# Patient Record
Sex: MALE | Race: WHITE
[De-identification: names, ages, dates, MRNs, and addresses within clinical notes are randomized per-mention and may not be internally consistent; named-entity substitution may affect disease eponyms.]

---

## 2018-06-26 LAB
ADD MANUAL DIFF: NO
ANION GAP SERPL CALC-SCNC: 14 MMOL/L (ref 5–19)
APTT BLD: 23.8 SEC (ref 23.5–35.8)
BASOPHILS # BLD AUTO: 0.1 10^3/UL (ref 0–0.2)
BASOPHILS NFR BLD AUTO: 0.8 % (ref 0–2)
BUN SERPL-MCNC: 30 MG/DL (ref 7–20)
CALCIUM: 10.5 MG/DL (ref 8.4–10.2)
CHLORIDE SERPL-SCNC: 99 MMOL/L (ref 98–107)
CO2 SERPL-SCNC: 28 MMOL/L (ref 22–30)
EOSINOPHIL # BLD AUTO: 0.2 10^3/UL (ref 0–0.6)
EOSINOPHIL NFR BLD AUTO: 2 % (ref 0–6)
ERYTHROCYTE [DISTWIDTH] IN BLOOD BY AUTOMATED COUNT: 13.1 % (ref 11.5–14)
GLUCOSE SERPL-MCNC: 79 MG/DL (ref 75–110)
HCT VFR BLD CALC: 38.7 % (ref 37.9–51)
HGB BLD-MCNC: 13.1 G/DL (ref 13.5–17)
INR PPP: 0.83
LYMPHOCYTES # BLD AUTO: 2 10^3/UL (ref 0.5–4.7)
LYMPHOCYTES NFR BLD AUTO: 24.4 % (ref 13–45)
MCH RBC QN AUTO: 31.8 PG (ref 27–33.4)
MCHC RBC AUTO-ENTMCNC: 33.7 G/DL (ref 32–36)
MCV RBC AUTO: 94 FL (ref 80–97)
MONOCYTES # BLD AUTO: 0.9 10^3/UL (ref 0.1–1.4)
MONOCYTES NFR BLD AUTO: 11.3 % (ref 3–13)
NEUTROPHILS # BLD AUTO: 5 10^3/UL (ref 1.7–8.2)
NEUTS SEG NFR BLD AUTO: 61.5 % (ref 42–78)
PLATELET # BLD: 178 10^3/UL (ref 150–450)
POTASSIUM SERPL-SCNC: 5 MMOL/L (ref 3.6–5)
PROTHROMBIN TIME: 11.8 SEC (ref 11.4–15.4)
RBC # BLD AUTO: 4.1 10^6/UL (ref 4.35–5.55)
SODIUM SERPL-SCNC: 140.9 MMOL/L (ref 137–145)
TOTAL CELLS COUNTED % (AUTO): 100 %
WBC # BLD AUTO: 8.2 10^3/UL (ref 4–10.5)

## 2018-06-26 NOTE — EKG REPORT
SEVERITY:- ABNORMAL ECG -

SINUS RHYTHM

RIGHT BUNDLE BRANCH BLOCK

:

Confirmed by: Chelsie Gaitan MD 26-Jun-2018 19:40:40

## 2018-07-03 ENCOUNTER — HOSPITAL ENCOUNTER (OUTPATIENT)
Dept: HOSPITAL 62 - OROUT | Age: 78
Discharge: HOME | End: 2018-07-03
Attending: PLASTIC SURGERY
Payer: MEDICARE

## 2018-07-03 VITALS — DIASTOLIC BLOOD PRESSURE: 73 MMHG | SYSTOLIC BLOOD PRESSURE: 148 MMHG

## 2018-07-03 DIAGNOSIS — C43.59: Primary | ICD-10-CM

## 2018-07-03 DIAGNOSIS — Z87.891: ICD-10-CM

## 2018-07-03 DIAGNOSIS — E11.9: ICD-10-CM

## 2018-07-03 DIAGNOSIS — Z79.1: ICD-10-CM

## 2018-07-03 DIAGNOSIS — Z79.84: ICD-10-CM

## 2018-07-03 DIAGNOSIS — Z79.899: ICD-10-CM

## 2018-07-03 PROCEDURE — 78195 LYMPH SYSTEM IMAGING: CPT

## 2018-07-03 PROCEDURE — 80048 BASIC METABOLIC PNL TOTAL CA: CPT

## 2018-07-03 PROCEDURE — 36415 COLL VENOUS BLD VENIPUNCTURE: CPT

## 2018-07-03 PROCEDURE — 88342 IMHCHEM/IMCYTCHM 1ST ANTB: CPT

## 2018-07-03 PROCEDURE — 85610 PROTHROMBIN TIME: CPT

## 2018-07-03 PROCEDURE — 88307 TISSUE EXAM BY PATHOLOGIST: CPT

## 2018-07-03 PROCEDURE — A9520 TC99 TILMANOCEPT DIAG 0.5MCI: HCPCS

## 2018-07-03 PROCEDURE — 88341 IMHCHEM/IMCYTCHM EA ADD ANTB: CPT

## 2018-07-03 PROCEDURE — 93005 ELECTROCARDIOGRAM TRACING: CPT

## 2018-07-03 PROCEDURE — 93010 ELECTROCARDIOGRAM REPORT: CPT

## 2018-07-03 PROCEDURE — 38500 BIOPSY/REMOVAL LYMPH NODES: CPT

## 2018-07-03 PROCEDURE — 84132 ASSAY OF SERUM POTASSIUM: CPT

## 2018-07-03 PROCEDURE — 85025 COMPLETE CBC W/AUTO DIFF WBC: CPT

## 2018-07-03 PROCEDURE — 14000 TIS TRNFR TRUNK 10 SQ CM/<: CPT

## 2018-07-03 PROCEDURE — 82962 GLUCOSE BLOOD TEST: CPT

## 2018-07-03 PROCEDURE — 85730 THROMBOPLASTIN TIME PARTIAL: CPT

## 2018-07-03 NOTE — OPERATIVE REPORT
Nonrecallable Operative Report


DATE OF SURGERY: 07/03/18


PREOPERATIVE DIAGNOSIS: Biopsy-proven melanoma of the left side


POSTOPERATIVE DIAGNOSIS: Same


OPERATION: Excision of melanoma from the left side with reconstruction with 

bilateral sliding advancement flaps


SURGEON: ASHLEY PRABHAKAR


ANESTHESIA: GA


TISSUE REMOVED OR ALTERED: Melanoma


COMPLICATIONS: 





None


ESTIMATED BLOOD LOSS: Minimal


PROCEDURE: 





Patient seen and was marked prior to being brought into the operating room


Patient was brought into the operating room and placed on the operating room 

table in a lateral decubitus position.


Patient was then prepped with a Betadine scrub and Betadine solution and draped 

in a sterile and aseptic manner.


The area was then marked.


12 O'clock was marked towards the chest      


3 O'clock was marked towards the back


 6:00 was marked towards the pelvis


9:00 was marked towards the stomach


The area was then anesthetized with 1% lidocaine with epinephrine and 

bicarbonate for its anesthetic and hemostatic effects.


The area was then excised and marked at 12:00.


The specimen was sent for frozen section.


The results came back that the deep and lateral margins were free.


We had considered a primary closure but this would go against the natural 

relaxed skin tension lines. 


A primary closure would be too tight and would have increased chance of 

dehiscence.


This will leave more of a scar so we decided to use a bilateral sliding 

advancement flap reconstruction which would camouflage the scar better and take 

tension off of the closure so that would be less chances of complications.


This was a very large defect after we had resected the melanoma as above noted 

we did consider multiple options and a sliding advancement flap would allow us 

to use whenever lax tissue still remained so that we can do a tension-free 

advancement flap and minimize the chance of dehiscence.


Then we went ahead and outlined the flap and anesthetized it.


We then incised the flap and developed a flap maintaining the subdermal plexus.


Then we undermined 360 to allow for plate like scarring and minimize trap door 

deformity.


Throughout the case hemostasis was achieved with the bipolar.


We then sutured the flap into its new position using 3-0 Vicryl for the 

subcutaneous and deep dermis.


Skin was closed with a [running subcuticular suture] stitch using 3-0 PDS with 

knots being tied on the outside.


And 3-0 PDS suture was used for support and placed in the central area of the 

incision.


 We then applied tincture benzoin and Steri-Strips followed by a light pressure 

dressing.


Patient was then reversed from anesthesia and taken to the [facility] for recovery. 


The patient tolerated well.  


There were no complications.  


Lesion size was initially measured at 6 x 6 cm for the resection the defect was 

9 cm x 6 cm the closure was over 15 cm please see pathology for actual size.





Portions of this note may be dictated using Dragon voice recognition software.


Occasional variations and spelling and vocabulary could be possible and are 

unintentional.


Additionally, there is a chance that some errors may not be caught or corrected.


Please notify the author of any discrepancies noted or if any statements are 

unclear.








Subjective: No complaints


Objective:   Vital signs stable afebrile


                   No bleeding


                   Dressing intact


Assessment and plan:


                   Doing well.


                   Elevate the operative site.


                   Resume medications.  


                   Take antibiotics for 1 day


                   Follow-up Thursday


                   Full instructions were given to the patient and family and 

they understand





Portions of this note may be dictated using Dragon voice recognition software.


Occasional variations and spelling and vocabulary could be possible and are 

unintentional.


Additionally, there is a chance that some errors may not be caught or corrected.


Please notify the offer of any discrepancies noted or if any statements are 

unclear.

## 2018-07-03 NOTE — RADIOLOGY REPORT (SQ)
EXAM DESCRIPTION:  NM LYMPHATICS/LYMPH GLANDS



COMPLETED DATE/TIME:  7/3/2018 11:20 am



REASON FOR STUDY:  MELANOMA C43.59  MALIGNANT MELANOMA OF OTHER PART OF TRUNK Z79.01  LONG TERM (CURR
ENT) USE OF ANTICOAGULANTS



COMPARISON:  None.



RADIONUCLIDE AND DOSE:  563 microcuries TC-99m tilmanocept - [employer].

The route of agent administration:  Subcutaneous in the skin.



TECHNIQUE:  The skin of the left flank was prepped in sterile fashion.  The radiopharmaceutical was a
dministered in equally divided doses in the skin surrounding the melanoma excision site



LIMITATIONS:  None.



FINDINGS:  Images demonstrate activity at the injection site.

There is migration superiorly towards the left axilla.  Left axillary lymph node is identified.

No migration of activity inferiorly toward the inguinal or iliac lymph nodes.



IMPRESSION:  ADMINISTRATION OF RADIOPHARMACEUTICAL FOR SENTINEL LYMPH NODE EVALUATION.



TECHNICAL DOCUMENTATION:  JOB ID:  [id number]

 2011 [employer]- All Rights Reserved



Reading location - IP/workstation name: [email or web address]

## 2018-07-03 NOTE — DISCHARGE SUMMARY
Discharge Summary (SDC)





- Discharge


Final Diagnosis: 


Melanoma of the left side


Date of Surgery: 07/03/18


Condition: Good


Treatment or Instructions: 



































Antibiotics for 1 day, then discontinue.





Elevate operative area to decrease swelling.





Do not strain, or lift heavy objects.





Call for excessive bleeding, increased temperature of 101, uncontrolled pain, 

or excessive nausea or vomiting.


You may reach Dr. Lunsford through his office at [phone].


In the event of an emergency after hours, then contact Dr. Lunsford through [facility].





Return to the office for a postop check on Thursday.  The time will be 

scheduled by the nursing staff  of [facility] prior to discharge.








Please give the patient a copy of their labs and EKG so they can bring this to 

their PMD.


Thank you





Portions of this note may be dictated using Dragon voice recognition software.


Occasional variations and spelling and vocabulary could be possible and are 

unintentional.


Additionally, there is a chance that some errors may not be caught or corrected.


Please notify the offer of any discrepancies noted or if any statements are 

unclear.





Referrals: 


JER DENSON NP [Primary Care Provider] - 


Discharge Diet: As Tolerated


Report the Following to Your Physician Immediately: Unusual Bleeding - Leave 

top dressing on for 2 days. The wound is glued closed. Clean it once a day with 

peroxide. Do not disrupt the glue.

## 2018-07-03 NOTE — OPERATIVE REPORT
Operative Report


DATE OF SURGERY: 07/03/18


PREOPERATIVE DIAGNOSIS: Melanoma of the left flank


POSTOPERATIVE DIAGNOSIS: Same as above


OPERATION: [place] lymph node biopsy of the left axilla


SURGEON: ASHLEY PRABHAKAR


1ST ASSISTANT: ASHLEY PRABHAKAR


ANESTHESIA: GA


TISSUE REMOVED OR ALTERED: [place] lymph node of the left axilla.


ESTIMATED BLOOD LOSS: Minimal

## 2018-07-03 NOTE — OPERATIVE REPORT
Operative Report


DATE OF SURGERY: 07/03/18


PREOPERATIVE DIAGNOSIS: Melanoma of the left flank


POSTOPERATIVE DIAGNOSIS: Same as above


OPERATION: [place] lymph node biopsy of the left axilla


SURGEON: NEYDA MCCOY


1ST ASSISTANT: ASHLEY PRABHAKAR


ANESTHESIA: GA


TISSUE REMOVED OR ALTERED: [place] lymph node of the left axilla.


ESTIMATED BLOOD LOSS: Minimal


PROCEDURE: 





Dr. Jonn villalpando assisted with the sentinel lymph node biopsy of the left axilla

## 2018-07-03 NOTE — OPERATIVE REPORT
Nonrecallable Operative Report


DATE OF SURGERY: 07/03/18


PREOPERATIVE DIAGNOSIS: Melanoma of the left flank


POSTOPERATIVE DIAGNOSIS: Same as above


OPERATION: [place] lymph node biopsy of the left axilla


SURGEON: NEYDA MCCOY


1ST ASSISTANT: ASHLEY PRABHAKAR


ANESTHESIA: GA


TISSUE REMOVED OR ALTERED: [place] lymph node of the left axilla.


COMPLICATIONS: 





None apparent.


ESTIMATED BLOOD LOSS: Minimal


PROCEDURE: 





Drains/implants: None.





Procedure in detail: After informed consent was obtained, the patient was laid 

in the supine position.  The area of the left axilla and left groin were 

inspected with a gamma probe.  No active hot spots could be identified in the 

groin.  One significantly active hotspot could be identified in the left 

axilla.  After this, the left axilla was prepped and draped in a normal sterile 

fashion.  An incision was created in an oblique fashion in the axilla over the 

hot spot area.  Dissection was carried through the subcutaneous tissue using 

sharp and blunt techniques.  Care was taken not to injure the surrounding 

neurovascular structures.  The sentinel lymph node was identified.  There was 

blue marking from the methylene blue, there was also activity identified by the 

gamma probe.  This lymph node was removed from the surrounding tissue using 

clips and Metzenbaum scissors.  Once the lymph node was removed, an ex vivo 

count was obtained.  The ex vivo sentinel lymph node count was 29,598.  The 

background count was found to be 71.  This confirmed that all sentinel lymph 

nodes were removed from the patient.  Hemostasis was ensured, and the axilla 

was irrigated.  The subcutaneous tissue was closed using 3-0 Vicryl suture in 

simple running fashion.  The overlying skin was closed using 4-0 Vicryl Rapide 

suture in subcuticular fashion.  A dressing was placed, and this portion of the 

procedure was concluded.  Dr. Prabhakar was present to perform his portion of the 

procedure.  Please see his dictation for details about this.





Condition: Stable.

## 2018-07-03 NOTE — OPERATIVE REPORT
Operative Report


DATE OF SURGERY: 07/03/18


PREOPERATIVE DIAGNOSIS: Melanoma of the left flank


POSTOPERATIVE DIAGNOSIS: Same as above


OPERATION: [place] lymph node biopsy of the left axilla


SURGEON: NEYDA MCCOY


1ST ASSISTANT: ASHLEY PRABHAKAR


ANESTHESIA: GA


TISSUE REMOVED OR ALTERED: [place] lymph node of the left axilla.


ESTIMATED BLOOD LOSS: Minimal

## 2018-07-03 NOTE — OPERATIVE REPORT
Operative Report


DATE OF SURGERY: 07/03/18


PREOPERATIVE DIAGNOSIS: Melanoma of the left flank


POSTOPERATIVE DIAGNOSIS: Same as above


OPERATION: [place] lymph node biopsy of the left axilla


SURGEON: NEYDA MCCOY


1ST ASSISTANT: ASHLEY PRABHAKAR


ANESTHESIA: GA


TISSUE REMOVED OR ALTERED: [place] lymph node of the left axilla.


ESTIMATED BLOOD LOSS: Minimal

## 2019-01-29 ENCOUNTER — HOSPITAL ENCOUNTER (OUTPATIENT)
Dept: HOSPITAL 62 - OD | Age: 79
End: 2019-01-29
Attending: INTERNAL MEDICINE
Payer: MEDICARE

## 2019-01-29 DIAGNOSIS — C43.59: Primary | ICD-10-CM

## 2019-01-29 PROCEDURE — 71046 X-RAY EXAM CHEST 2 VIEWS: CPT

## 2019-08-01 LAB
ADD MANUAL DIFF: NO
ANION GAP SERPL CALC-SCNC: 9 MMOL/L (ref 5–19)
APTT BLD: 25.9 SEC (ref 23.5–35.8)
BASOPHILS # BLD AUTO: 0 10^3/UL (ref 0–0.2)
BASOPHILS NFR BLD AUTO: 0.5 % (ref 0–2)
BUN SERPL-MCNC: 29 MG/DL (ref 7–20)
CALCIUM: 9.6 MG/DL (ref 8.4–10.2)
CHLORIDE SERPL-SCNC: 97 MMOL/L (ref 98–107)
CO2 SERPL-SCNC: 28 MMOL/L (ref 22–30)
EOSINOPHIL # BLD AUTO: 0.1 10^3/UL (ref 0–0.6)
EOSINOPHIL NFR BLD AUTO: 1.7 % (ref 0–6)
ERYTHROCYTE [DISTWIDTH] IN BLOOD BY AUTOMATED COUNT: 13.8 % (ref 11.5–14)
GLUCOSE SERPL-MCNC: 102 MG/DL (ref 75–110)
HCT VFR BLD CALC: 35.6 % (ref 37.9–51)
HGB BLD-MCNC: 12.1 G/DL (ref 13.5–17)
INR PPP: 0.92
LYMPHOCYTES # BLD AUTO: 1.1 10^3/UL (ref 0.5–4.7)
LYMPHOCYTES NFR BLD AUTO: 18.1 % (ref 13–45)
MCH RBC QN AUTO: 30.7 PG (ref 27–33.4)
MCHC RBC AUTO-ENTMCNC: 34.1 G/DL (ref 32–36)
MCV RBC AUTO: 90 FL (ref 80–97)
MONOCYTES # BLD AUTO: 0.7 10^3/UL (ref 0.1–1.4)
MONOCYTES NFR BLD AUTO: 11.6 % (ref 3–13)
NEUTROPHILS # BLD AUTO: 4.3 10^3/UL (ref 1.7–8.2)
NEUTS SEG NFR BLD AUTO: 68.1 % (ref 42–78)
PLATELET # BLD: 172 10^3/UL (ref 150–450)
POTASSIUM SERPL-SCNC: 5.2 MMOL/L (ref 3.6–5)
PROTHROMBIN TIME: 12.3 SEC (ref 11.4–15.4)
RBC # BLD AUTO: 3.94 10^6/UL (ref 4.35–5.55)
TOTAL CELLS COUNTED % (AUTO): 100 %
WBC # BLD AUTO: 6.3 10^3/UL (ref 4–10.5)

## 2019-08-01 NOTE — EKG REPORT
SEVERITY:- ABNORMAL ECG -

SINUS RHYTHM

RIGHT BUNDLE BRANCH BLOCK

:

Confirmed by: Chelsie Gaitan MD 01-Aug-2019 18:14:47

## 2019-08-06 ENCOUNTER — HOSPITAL ENCOUNTER (OUTPATIENT)
Dept: HOSPITAL 62 - OROUT | Age: 79
Discharge: HOME | End: 2019-08-06
Attending: PLASTIC SURGERY
Payer: MEDICARE

## 2019-08-06 VITALS — SYSTOLIC BLOOD PRESSURE: 144 MMHG | DIASTOLIC BLOOD PRESSURE: 69 MMHG

## 2019-08-06 DIAGNOSIS — E11.9: ICD-10-CM

## 2019-08-06 DIAGNOSIS — C44.619: Primary | ICD-10-CM

## 2019-08-06 DIAGNOSIS — I10: ICD-10-CM

## 2019-08-06 DIAGNOSIS — Z79.84: ICD-10-CM

## 2019-08-06 DIAGNOSIS — Z85.820: ICD-10-CM

## 2019-08-06 DIAGNOSIS — Z79.899: ICD-10-CM

## 2019-08-06 DIAGNOSIS — Z85.828: ICD-10-CM

## 2019-08-06 LAB
GLUCOSE SERPL-MCNC: 116 MG/DL (ref 75–110)
POTASSIUM SERPL-SCNC: 5.1 MMOL/L (ref 3.6–5)

## 2019-08-06 PROCEDURE — 88305 TISSUE EXAM BY PATHOLOGIST: CPT

## 2019-08-06 PROCEDURE — 84132 ASSAY OF SERUM POTASSIUM: CPT

## 2019-08-06 PROCEDURE — 93010 ELECTROCARDIOGRAM REPORT: CPT

## 2019-08-06 PROCEDURE — 36415 COLL VENOUS BLD VENIPUNCTURE: CPT

## 2019-08-06 PROCEDURE — 80048 BASIC METABOLIC PNL TOTAL CA: CPT

## 2019-08-06 PROCEDURE — 85025 COMPLETE CBC W/AUTO DIFF WBC: CPT

## 2019-08-06 PROCEDURE — 88331 PATH CONSLTJ SURG 1 BLK 1SPC: CPT

## 2019-08-06 PROCEDURE — 85610 PROTHROMBIN TIME: CPT

## 2019-08-06 PROCEDURE — 85730 THROMBOPLASTIN TIME PARTIAL: CPT

## 2019-08-06 PROCEDURE — 93005 ELECTROCARDIOGRAM TRACING: CPT

## 2019-08-06 PROCEDURE — 14020 TIS TRNFR S/A/L 10 SQ CM/<: CPT

## 2019-08-06 PROCEDURE — 82947 ASSAY GLUCOSE BLOOD QUANT: CPT

## 2019-08-06 NOTE — OPERATIVE REPORT
Operative Report


DATE OF SURGERY: 08/06/19


PREOPERATIVE DIAGNOSIS: Basal cell carcinoma of the left distal radial forearm 

with positive deep margin


POSTOPERATIVE DIAGNOSIS: Same


OPERATION: Excision of basal cell carcinoma from the left distal radial forearm 

with frozen section margin control and reconstruction with a rotation flap


SURGEON: ASHLEY PRABHAKAR


ANESTHESIA: LMAC


TISSUE REMOVED OR ALTERED: Basal cell carcinoma


COMPLICATIONS: 





None


ESTIMATED BLOOD LOSS: Minimal


PROCEDURE: 





 Patient seen and was marked prior to being brought into the operating room.


Patient was brought into the operating room and placed on the operating room 

table in a supine position.


Patient was then prepped with a Betadine scrub and Betadine solution and draped 

in a sterile and aseptic manner.


The area was then marked.


12 O'clock was marked towards the antecubital fossa                             

            


3 O'clock was marked towards the dorsal forearm 


 6:00 was marked towards the wrist


9:00 was marked towards the volar forearm


The area was then anesthetized with 1% lidocaine with epinephrine and 

bicarbonate for its anesthetic and hemostatic effects.


The area was then excised and marked at 12:00.


The specimen was sent for frozen section.


The results came back that the deep and lateral margins were free.


We had considered a primary closure but this would go against the natural 

relaxed skin tension lines. 


A primary closure would be too tight and would have increased chance of 

dehiscence.


This will leave more of a scar so we decided to use a rotation flap 

reconstruction which would camouflage the scar better and take tension off of 

the closure so that would be less chances of complications.  


The patient had very thin frail skin.


It was felt that with the rotation flap this would take tension off of the 

reconstruction and minimize the chance of dehiscence.


The rotation flap would also allow us to harvest where there was excess skin and

bring it into the area where there was a paucity of skin from the resection to 

again give a tension-free reconstruction.


Then we went ahead and outlined the flap and anesthetized it.


We then incised the flap and developed a flap maintaining the subdermal plexus.


Then we undermined 360 to allow for plate like scarring and minimize trap door 

deformity.


Throughout the case hemostasis was achieved with the bipolar.


We then sutured the flap into its new position using 4-0 Vicryl for the 

subcutaneous and deep dermis.


Skin was closed with a running subcuticular suture stitch using 4-0 PDS with 

knots being tied on the outside.


And 4-0 PDS suture was used for support and placed in the central area of the 

incision.


 We then applied tincture benzoin and Steri-Strips followed by a light pressure 

dressing.


Patient was then reversed from anesthesia and taken to the [facility] for recovery. 


The patient tolerated well.  


There were no complications.  


Lesion size was approximately 1.2 cm please see pathology for actual size.


 


Portions of this note may be dictated using Dragon voice recognition software.


Occasional variations and spelling and vocabulary could be possible and are 

unintentional.


Additionally, there is a chance that some errors may not be caught or corrected.


Please notify the author of any discrepancies noted or if any statements are 

unclear.





Subjective: No complaints


Objective:   Vital signs stable afebrile


                   No bleeding


                   Dressing intact


Assessment and plan:


                   Doing well.


                   Elevate the operative site.


                   Resume medications.  


                   Take antibiotics for 1 day


                   Follow-up Thursday


                   Full instructions were given to the patient and family and 

they understand


 


Portions of this note may be dictated using Dragon voice recognition software.


Occasional variations and spelling and vocabulary could be possible and are 

unintentional.


Additionally, there is a chance that some errors may not be caught or corrected.


Please notify the offer of any discrepancies noted or if any statements are 

unclear.

## 2019-08-06 NOTE — DISCHARGE SUMMARY
Discharge Summary (SDC)





- Discharge


Final Diagnosis: 





Basal cell carcinoma of the left distal radial forearm


Date of Surgery: 08/06/19


Condition: Good


Forms:  [facility] Anesthesia D/C Instruction, Discharge POC-Surgical Service


Treatment or Instructions: 


 


 


 


 


 


 


 


 


 


Leave the top dressing on for 2 days, then removed.


 


Leave the steri-strip tapes on for 5 days, then removal.


 


Then cleaning wound with peroxide and apply Neosporin/bacitracin 3 times per 

day.


 


Antibiotics for 1 day, then discontinue.


 


Elevate operative area to decrease swelling.


 


Do not strain, or lift heavy objects.


 


Call for excessive bleeding, increased temperature of 101, uncontrolled pain, 

or excessive nausea or vomiting.


You may reach Dr. Prabhakar through his office at [phone].


In the event of an emergency after hours, then contact Dr. Prabhakar through [facility].


 


Return to the office for a postop check on Thursday.  The time will be scheduled

by the nursing staff  of [facility] prior to discharge.


 


 


Please give the patient a copy of their labs and EKG so they can bring this to 

their PMD.


Thank you


 


Portions of this note may be dictated using Dragon voice recognition software.


Occasional variations and spelling and vocabulary could be possible and are 

unintentional.


Additionally, there is a chance that some errors may not be caught or corrected.


Please notify the offer of any discrepancies noted or if any statements are 

unclear.


 


Referrals: 


ASHLEY PRABHAKAR MD [ACTIVE STAFF] - 


Discharge Diet: As Tolerated


Discharge Activity: No Lifting/Push/Pulling


Report the Following to Your Physician Immediately: Unusual Bleeding - Keep hand

elevated. Monitor capillary refill. Do not do any heavy lifting or bending of 

the arm

## 2020-02-13 ENCOUNTER — HOSPITAL ENCOUNTER (OUTPATIENT)
Dept: HOSPITAL 62 - RAD | Age: 80
End: 2020-02-13
Payer: MEDICARE

## 2020-02-13 DIAGNOSIS — C79.31: Primary | ICD-10-CM

## 2020-02-13 PROCEDURE — 71250 CT THORAX DX C-: CPT

## 2020-02-13 PROCEDURE — 74176 CT ABD & PELVIS W/O CONTRAST: CPT

## 2020-02-13 PROCEDURE — 82565 ASSAY OF CREATININE: CPT

## 2020-02-13 NOTE — RADIOLOGY REPORT (SQ)
EXAM DESCRIPTION:  CT ABD/PELVIS ORAL ONLY



COMPLETED DATE/TIME:  2/13/2020 9:02 am



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
SM OF BRAIN



COMPARISON:  Outside CT dated 1/2/2020.



TECHNIQUE:  CT scan of the abdomen and pelvis performed without intravenous or oral contrast. Images 
reviewed with lung, soft tissue, and bone windows. Reconstructed coronal and sagittal MPR images revi
ewed. All images stored on PACS.

All CT scanners at this facility use dose modulation, iterative reconstruction, and/or weight based d
osing when appropriate to reduce radiation dose to as low as reasonably achievable (ALARA).

[facility]: Dose Right  [facility]: CareDose    [facility]: Dose Right    [facility]: Teradose 4D    [facility]: Smart Technologies



RADIATION DOSE:  CT Rad equipment meets quality standard of care and radiation dose reduction techniq
ues were employed. CTDIvol: 5.3 - 5.5 mGy. DLP: 524 mGy-cm.mGy.



LIMITATIONS:  None.



FINDINGS:  LOWER CHEST: See separate report of the CT of the chest.

LIVER: Evaluation limited by lack of IV contrast.  Low-attenuation mass in the posterior right lobe. 
 Maximum transverse measurements 4.6 by 5.5 cm.  Previous maximal measurements approximately 5.8 x 6.
5 cm.  No other hepatic lesions.

SPLEEN: Evaluation limited by lack of IV contrast. No identified significant masses.

ADRENALS: Evaluation limited by lack of IV contrast. No identified significant masses.

PANCREAS: No masses. No peripancreatic inflammatory changes.

GALLBLADDER: No identified stones by CT criteria. No inflammatory changes to suggest cholecystitis.

RIGHT KIDNEY AND URETER: No suspicious masses. Assessment limited by lack of IV contrast.   No signif
icant calcifications.   No hydronephrosis or hydroureter.

LEFT KIDNEY AND URETER: No suspicious masses. Assessment limited by lack of IV contrast.   No signifi
cant calcifications.   No hydronephrosis or hydroureter.

AORTA AND RETROPERITONEUM: No aneurysm. No retroperitoneal masses or adenopathy.

BOWEL AND PERITONEAL CAVITY: No obvious masses or inflammatory changes. No free fluid.

APPENDIX: Normal.

PELVIS, BLADDER, AND ABDOMINAL WALL:No abnormal masses. No free fluid. Bladder normal.

BONES: No significant findings.  Degenerative changes in the lower lumbar spine.

OTHER: No other significant finding.



IMPRESSION:  INTERVAL DECREASE IN SIZE OF THE MASS IN THE POSTERIOR RIGHT LOBE OF THE LIVER.  NO NEW 
HEPATIC LESIONS.  NO OTHER METASTASES OR SIGNIFICANT OR ACUTE PROCESS IN THE ABDOMEN OR PELVIS.



COMMENT:  Quality ID # 436: Final reports with documentation of one or more dose reduction techniques
 (e.g., Automated exposure control, adjustment of the mA and/or kV according to patient size, use of 
iterative reconstruction technique)



TECHNICAL DOCUMENTATION:  JOB ID:  [id number]

 2011 [employer]- All Rights Reserved



Reading location - IP/workstation name: [facility]

## 2020-02-13 NOTE — RADIOLOGY REPORT (SQ)
EXAM DESCRIPTION:  CT CHEST WITHOUT



COMPLETED DATE/TIME:  2/13/2020 9:17 am



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
SM OF BRAIN



COMPARISON:  Outside PET-CT scan dated 12/11/2019.



TECHNIQUE:  CT scan performed of the chest without intravenous contrast.  Images reviewed with lung, 
soft tissue and bone windows.  Reconstructed coronal and sagittal MPR images reviewed.  All images st
ored on PACS.

All CT scanners at this facility use dose modulation, iterative reconstruction, and/or weight based d
osing when appropriate to reduce radiation dose to as low as reasonably achievable (ALARA).

[facility]: Dose Right  [facility]: CareDose    [facility]: Dose Right    [facility]: Teradose 4D    [facility]: Smart Technologies



RADIATION DOSE:   mGy.



LIMITATIONS:  No technical limitations.



FINDINGS:  LUNGS AND PLEURA: Scattered linear areas of parenchymal scarring.  No masses, infiltrates,
 or pneumothorax.  No pleural effusions or pleural calcifications.

HILAR AND MEDIASTINAL STRUCTURES: No identified masses or abnormal nodes.  No obvious aneurysm.

HEART AND VASCULAR STRUCTURES: No aneurysm.  Vascular calcifications.  Coronary artery calcifications
.  No pericardial effusion.

UPPER ABDOMEN: See separate report of the CT of the abdomen.

THYROID AND OTHER SOFT TISSUES: No masses.  No adenopathy.

BONES: No significant finding.

HARDWARE: Surgical clips in the left axilla.

OTHER: No other significant findings.



IMPRESSION:  SCATTERED AREAS OF PARENCHYMAL SCARRING IN THE LUNGS.  NO OTHER SIGNIFICANT FINDINGS.  N
O EVIDENCE OF METASTATIC INVOLVEMENT IN THE CHEST.



TECHNICAL DOCUMENTATION:  JOB ID:  [id number]

Quality ID # 436: Final reports with documentation of one or more dose reduction techniques (e.g., Au
tomated exposure control, adjustment of the mA and/or kV according to patient size, use of iterative 
reconstruction technique)

 2011 [email or web address]- All Rights Reserved



Reading location - IP/workstation name: [facility]

## 2020-08-28 LAB
ADD MANUAL DIFF: NO
ANION GAP SERPL CALC-SCNC: 7 MMOL/L (ref 5–19)
APTT BLD: 29.8 SEC (ref 23.5–35.8)
BASOPHILS # BLD AUTO: 0 10^3/UL (ref 0–0.2)
BASOPHILS NFR BLD AUTO: 0.6 % (ref 0–2)
BUN SERPL-MCNC: 18 MG/DL (ref 7–20)
CALCIUM: 9.7 MG/DL (ref 8.4–10.2)
CHLORIDE SERPL-SCNC: 100 MMOL/L (ref 98–107)
CO2 SERPL-SCNC: 29 MMOL/L (ref 22–30)
EOSINOPHIL # BLD AUTO: 0.3 10^3/UL (ref 0–0.6)
EOSINOPHIL NFR BLD AUTO: 4.4 % (ref 0–6)
ERYTHROCYTE [DISTWIDTH] IN BLOOD BY AUTOMATED COUNT: 13.8 % (ref 11.5–14)
GLUCOSE SERPL-MCNC: 123 MG/DL (ref 75–110)
HCT VFR BLD CALC: 36.9 % (ref 37.9–51)
HGB BLD-MCNC: 12.5 G/DL (ref 13.5–17)
INR PPP: 0.92
LYMPHOCYTES # BLD AUTO: 1.7 10^3/UL (ref 0.5–4.7)
LYMPHOCYTES NFR BLD AUTO: 24.7 % (ref 13–45)
MCH RBC QN AUTO: 31.7 PG (ref 27–33.4)
MCHC RBC AUTO-ENTMCNC: 34 G/DL (ref 32–36)
MCV RBC AUTO: 93 FL (ref 80–97)
MONOCYTES # BLD AUTO: 0.7 10^3/UL (ref 0.1–1.4)
MONOCYTES NFR BLD AUTO: 9.4 % (ref 3–13)
NEUTROPHILS # BLD AUTO: 4.2 10^3/UL (ref 1.7–8.2)
NEUTS SEG NFR BLD AUTO: 60.9 % (ref 42–78)
PLATELET # BLD: 157 10^3/UL (ref 150–450)
POTASSIUM SERPL-SCNC: 4.2 MMOL/L (ref 3.6–5)
PROTHROMBIN TIME: 12.5 SEC (ref 11.4–15.4)
RBC # BLD AUTO: 3.96 10^6/UL (ref 4.35–5.55)
TOTAL CELLS COUNTED % (AUTO): 100 %
WBC # BLD AUTO: 7 10^3/UL (ref 4–10.5)

## 2020-08-28 NOTE — EKG REPORT
SEVERITY:- ABNORMAL ECG -

SINUS BRADYCARDIA

RIGHT BUNDLE BRANCH BLOCK

:

Confirmed by: Chelsie Gaitan MD 28-Aug-2020 11:14:39

## 2020-09-01 ENCOUNTER — HOSPITAL ENCOUNTER (OUTPATIENT)
Dept: HOSPITAL 62 - OROUT | Age: 80
Discharge: HOME | End: 2020-09-01
Attending: PLASTIC SURGERY
Payer: MEDICARE

## 2020-09-01 VITALS — SYSTOLIC BLOOD PRESSURE: 140 MMHG | DIASTOLIC BLOOD PRESSURE: 90 MMHG

## 2020-09-01 DIAGNOSIS — Z79.82: ICD-10-CM

## 2020-09-01 DIAGNOSIS — C44.629: Primary | ICD-10-CM

## 2020-09-01 DIAGNOSIS — E11.9: ICD-10-CM

## 2020-09-01 DIAGNOSIS — Z79.84: ICD-10-CM

## 2020-09-01 DIAGNOSIS — Z85.828: ICD-10-CM

## 2020-09-01 DIAGNOSIS — I10: ICD-10-CM

## 2020-09-01 DIAGNOSIS — Z85.820: ICD-10-CM

## 2020-09-01 DIAGNOSIS — Z03.818: ICD-10-CM

## 2020-09-01 DIAGNOSIS — Z79.899: ICD-10-CM

## 2020-09-01 LAB
GLUCOSE SERPL-MCNC: 118 MG/DL (ref 75–110)
POTASSIUM SERPL-SCNC: 4.3 MMOL/L (ref 3.6–5)

## 2020-09-01 PROCEDURE — 00400 ANES INTEGUMENTARY SYS NOS: CPT

## 2020-09-01 PROCEDURE — 85730 THROMBOPLASTIN TIME PARTIAL: CPT

## 2020-09-01 PROCEDURE — 85025 COMPLETE CBC W/AUTO DIFF WBC: CPT

## 2020-09-01 PROCEDURE — 88305 TISSUE EXAM BY PATHOLOGIST: CPT

## 2020-09-01 PROCEDURE — 85610 PROTHROMBIN TIME: CPT

## 2020-09-01 PROCEDURE — 87635 SARS-COV-2 COVID-19 AMP PRB: CPT

## 2020-09-01 PROCEDURE — 84132 ASSAY OF SERUM POTASSIUM: CPT

## 2020-09-01 PROCEDURE — 80048 BASIC METABOLIC PNL TOTAL CA: CPT

## 2020-09-01 PROCEDURE — 14020 TIS TRNFR S/A/L 10 SQ CM/<: CPT

## 2020-09-01 PROCEDURE — 93005 ELECTROCARDIOGRAM TRACING: CPT

## 2020-09-01 PROCEDURE — 88331 PATH CONSLTJ SURG 1 BLK 1SPC: CPT

## 2020-09-01 PROCEDURE — 36415 COLL VENOUS BLD VENIPUNCTURE: CPT

## 2020-09-01 PROCEDURE — 93010 ELECTROCARDIOGRAM REPORT: CPT

## 2020-09-01 PROCEDURE — C9803 HOPD COVID-19 SPEC COLLECT: HCPCS

## 2020-09-01 PROCEDURE — 82947 ASSAY GLUCOSE BLOOD QUANT: CPT

## 2020-09-01 PROCEDURE — 83036 HEMOGLOBIN GLYCOSYLATED A1C: CPT

## 2020-09-01 NOTE — OPERATIVE REPORT
Operative Report


DATE OF SURGERY: 09/01/20


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

sitive deep and lateral margins


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


OPERATION: Excision of squamous cell carcinoma of the left mid forearm with 

frozen section margin control and reconstruction with a 0 to S plasty flap 

reconstruction


SURGEON: ASHLEY PRABHAKAR


ANESTHESIA: LMAC


TISSUE REMOVED OR ALTERED: Squamous cell carcinoma


COMPLICATIONS: 





None


ESTIMATED BLOOD LOSS: Minimal


PROCEDURE: 





 Patient seen and was marked prior to being brought into the operating room.


Patient was brought into the operating room and placed on the operating room 

table in a supine position.


Patient was then prepped with a Betadine scrub and Betadine solution and draped 

in a sterile and aseptic manner.


The area was then marked.


12 O'clock was marked towards the the elbow                                     

    


3 O'clock was marked towards the ulnar side of the arm


 6:00 was marked towards the wrist


9:00 was marked towards the radial side of the arm


The area was then anesthetized with 1% lidocaine with epinephrine and 

bicarbonate for its anesthetic and hemostatic effects.


The area was then excised and marked at 12:00.


The specimen was sent for frozen section.


The results came back that the deep and lateral margins were free.


We had considered a primary closure but this would go against the natural 

relaxed skin tension lines. 


A primary closure would be too tight and would have increased chance of 

dehiscence.


This will leave more of a scar so we decided to use a O to S flap reconstruction

which would camouflage the scar better and take tension off of the closure so 

that would be less chances of complications.


Then we went ahead and outlined the flap and anesthetized it.


It was felt that by using this type of flap this would allow us to minimized 

puckering and dogear deformity.


This would also allow us to bring skin into the area of the defect with minimal 

tension.


Using a direct closure again would have been too tight and the skin was so frail

and would not be able to hold the sutures.


This is why we had to use a more advanced reconstruction as noted.


We then incised the flap and developed a flap maintaining the subdermal plexus.


Then we undermined 360 to allow for plate like scarring and minimize trap door 

deformity.


Throughout the case hemostasis was achieved with the bipolar.


The skin was very frail and it was difficult to get a good deep dermal closure 

because of the thin nature of the skin.


We then sutured the flap into its new position using 4-0 Vicryl for the 

subcutaneous and deep dermis as best as the skin could tolerate.


Skin was closed with staples.


 We then applied bacitracin followed by a light pressure dressing.


Patient was then reversed from anesthesia and taken to the [facility] for recovery. 


The patient tolerated well.  


There were no complications.  


Lesion size was approximately 2 cm please see pathology for actual size.


 


Portions of this note may be dictated using Dragon voice recognition software.


Occasional variations and spelling and vocabulary could be possible and are 

unintentional.


additionally, there is a chance that some errors may not be caught or corrected.


Please notify the author of any discrepancies noted or if any statements are 

unclear.














Subjective: No complaints


Objective:   Vital signs stable afebrile


                   No bleeding


                   Dressing intact


Assessment and plan:


                   Doing well.


                   Elevate the operative site.


                   Resume medications.  


                   Take antibiotics for 1 day


                   Follow-up Tuesday.Full instructions were given to the patient

and family and they understand


Portions of this note may be dictated using Dragon voice recognition software.


Occasional variations and spelling and vocabulary could be possible and are 

unintentional.


Additionally, there is a chance that some errors may not be caught or corrected.


Please notify the offer of any discrepancies noted or if any statements are 

unclear.

## 2020-09-01 NOTE — DISCHARGE SUMMARY
Discharge Summary (SDC)





- Discharge


Final Diagnosis: 





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


Date of Surgery: 09/01/20


Condition: Good


Treatment or Instructions: 


 


 


 


 


 


 


 


 


 


Leave the top dressing in place for 2 days.





Clean the incision with peroxide and apply bacitracin twice a day.


 


Antibiotics for 1 day, then discontinue.


 


Elevate operative area to decrease swelling.


 


Do not strain, or lift heavy objects.


 


Call for excessive bleeding, increased temperature of 101, uncontrolled pain, 

or excessive nausea or vomiting.


You may reach Dr. Lunsford through his office at [phone].


In the event of an emergency after hours, then contact Dr. Lunsford through [facility].


 


Return to the office for a postop check on Thursday.  The time will be scheduled

by the nursing staff  of [facility] prior to discharge.


 


 


Please give the patient a copy of their labs and EKG so they can bring this to 

their PMD.


Thank you


 


Portions of this note may be dictated using Dragon voice recognition software.


Occasional variations and spelling and vocabulary could be possible and are 

unintentional.


Additionally, there is a chance that some errors may not be caught or corrected.


Please notify the offer of any discrepancies noted or if any statements are 

unclear.


 


Referrals: 


JER DENSON NP [Primary Care Provider] -

## 2020-10-06 ENCOUNTER — HOSPITAL ENCOUNTER (OUTPATIENT)
Dept: HOSPITAL 62 - OD | Age: 80
End: 2020-10-06
Payer: MEDICARE

## 2020-10-06 DIAGNOSIS — C79.31: Primary | ICD-10-CM

## 2020-10-06 LAB
ALBUMIN SERPL-MCNC: 4.6 G/DL (ref 3.5–5)
ALP SERPL-CCNC: 77 U/L (ref 38–126)
ANION GAP SERPL CALC-SCNC: 10 MMOL/L (ref 5–19)
AST SERPL-CCNC: 24 U/L (ref 17–59)
BILIRUB DIRECT SERPL-MCNC: 0.3 MG/DL (ref 0–0.4)
BILIRUB SERPL-MCNC: 0.6 MG/DL (ref 0.2–1.3)
BUN SERPL-MCNC: 23 MG/DL (ref 7–20)
CALCIUM: 9.7 MG/DL (ref 8.4–10.2)
CHLORIDE SERPL-SCNC: 97 MMOL/L (ref 98–107)
CO2 SERPL-SCNC: 28 MMOL/L (ref 22–30)
GLUCOSE SERPL-MCNC: 162 MG/DL (ref 75–110)
POTASSIUM SERPL-SCNC: 4.4 MMOL/L (ref 3.6–5)
PROT SERPL-MCNC: 7.1 G/DL (ref 6.3–8.2)

## 2020-10-06 PROCEDURE — 80053 COMPREHEN METABOLIC PANEL: CPT

## 2020-10-06 PROCEDURE — 36415 COLL VENOUS BLD VENIPUNCTURE: CPT
